# Patient Record
Sex: MALE | Race: BLACK OR AFRICAN AMERICAN
[De-identification: names, ages, dates, MRNs, and addresses within clinical notes are randomized per-mention and may not be internally consistent; named-entity substitution may affect disease eponyms.]

---

## 2019-11-11 LAB
BASOPHILS # BLD AUTO: 0 10*3/UL (ref 0–0.1)
BASOPHILS NFR BLD AUTO: 0.8 % (ref 0–1)
DEPRECATED NEUTROPHILS # BLD AUTO: 1.9 10*3/UL (ref 2.1–6.9)
EOSINOPHIL # BLD AUTO: 0.1 10*3/UL (ref 0–0.4)
EOSINOPHIL NFR BLD AUTO: 1.6 % (ref 0–6)
ERYTHROCYTE [DISTWIDTH] IN CORD BLOOD: 12.4 % (ref 11.7–14.4)
HCT VFR BLD AUTO: 35.9 % (ref 38.2–49.6)
HGB BLD-MCNC: 12.2 G/DL (ref 14–18)
LYMPHOCYTES # BLD: 1.6 10*3/UL (ref 1–3.2)
LYMPHOCYTES NFR BLD AUTO: 40.8 % (ref 18–39.1)
MCH RBC QN AUTO: 33.1 PG (ref 28–32)
MCHC RBC AUTO-ENTMCNC: 34 G/DL (ref 31–35)
MCV RBC AUTO: 97.3 FL (ref 81–99)
MONOCYTES # BLD AUTO: 0.3 10*3/UL (ref 0.2–0.8)
MONOCYTES NFR BLD AUTO: 6.8 % (ref 4.4–11.3)
NEUTS SEG NFR BLD AUTO: 49.7 % (ref 38.7–80)
PLATELET # BLD AUTO: 248 X10E3/UL (ref 140–360)
RBC # BLD AUTO: 3.69 X10E6/UL (ref 4.3–5.7)

## 2019-11-12 ENCOUNTER — HOSPITAL ENCOUNTER (OUTPATIENT)
Dept: HOSPITAL 88 - OR | Age: 64
Discharge: HOME | End: 2019-11-12
Attending: INTERNAL MEDICINE
Payer: MEDICARE

## 2019-11-12 VITALS — SYSTOLIC BLOOD PRESSURE: 139 MMHG | DIASTOLIC BLOOD PRESSURE: 87 MMHG

## 2019-11-12 DIAGNOSIS — Z79.84: ICD-10-CM

## 2019-11-12 DIAGNOSIS — Z01.812: ICD-10-CM

## 2019-11-12 DIAGNOSIS — Z79.02: ICD-10-CM

## 2019-11-12 DIAGNOSIS — I48.91: ICD-10-CM

## 2019-11-12 DIAGNOSIS — Z86.73: ICD-10-CM

## 2019-11-12 DIAGNOSIS — Z12.11: Primary | ICD-10-CM

## 2019-11-12 DIAGNOSIS — E11.9: ICD-10-CM

## 2019-11-12 DIAGNOSIS — I10: ICD-10-CM

## 2019-11-12 DIAGNOSIS — D12.2: ICD-10-CM

## 2019-11-12 PROCEDURE — 85025 COMPLETE CBC W/AUTO DIFF WBC: CPT

## 2019-11-12 PROCEDURE — 45385 COLONOSCOPY W/LESION REMOVAL: CPT

## 2019-11-12 PROCEDURE — 36415 COLL VENOUS BLD VENIPUNCTURE: CPT

## 2019-11-12 PROCEDURE — 88305 TISSUE EXAM BY PATHOLOGIST: CPT

## 2019-11-12 PROCEDURE — 44391 COLONOSCOPY FOR BLEEDING: CPT

## 2019-11-12 PROCEDURE — 82948 REAGENT STRIP/BLOOD GLUCOSE: CPT

## 2019-11-12 NOTE — OPERATIVE REPORT
DATE OF PROCEDURE:  

 

SURGEON:  Sawyer Field MD

 

NAME OF THE PROCEDURE:  Colonoscopy.

 

PREOPERATIVE DIAGNOSIS:  Colon cancer screening.

 

DESCRIPTION OF PROCEDURE:  After informed written consent, premedications with monitored

anesthesia care, a standard video Olympus colonoscope was introduced into the rectum and

all the way into the terminal ileum.  The terminal ileum and cecum appeared to be

normal.  The proximal ascending colon showed a 6 mm polyp, removed by cold snare and one

Endoclip was placed since the patient was on Eliquis.  The descending, transverse,

sigmoid, rectum did not show any obstructive lesions, but the prep was suboptimal. 

 

IMPRESSION:  

1. Suboptimal prep.

2. Colon polyp.

 

RECOMMENDATION:  Resume blood thinners tomorrow and repeat colonoscopy in one year with

a two-day prep to rule out any other lesions. 

 

 

 

 

______________________________

Sawyer Field MD

 

SR/MODL

D:  11/12/2019 08:55:30

T:  11/12/2019 15:34:34

Job #:  [id number]

 

cc:            Lindsey Underwood MD; FAX:[phone] [address] [place] [address].

## 2020-06-12 LAB
BASOPHILS # BLD AUTO: 0 10*3/UL (ref 0–0.1)
BASOPHILS NFR BLD AUTO: 0.7 % (ref 0–1)
DEPRECATED NEUTROPHILS # BLD AUTO: 1.8 10*3/UL (ref 2.1–6.9)
EOSINOPHIL # BLD AUTO: 0.1 10*3/UL (ref 0–0.4)
EOSINOPHIL NFR BLD AUTO: 1.9 % (ref 0–6)
ERYTHROCYTE [DISTWIDTH] IN CORD BLOOD: 12.6 % (ref 11.7–14.4)
HCT VFR BLD AUTO: 35.7 % (ref 38.2–49.6)
HGB BLD-MCNC: 11.6 G/DL (ref 14–18)
LYMPHOCYTES # BLD: 2 10*3/UL (ref 1–3.2)
LYMPHOCYTES NFR BLD AUTO: 47.6 % (ref 18–39.1)
MCH RBC QN AUTO: 32.5 PG (ref 28–32)
MCHC RBC AUTO-ENTMCNC: 32.5 G/DL (ref 31–35)
MCV RBC AUTO: 100 FL (ref 81–99)
MONOCYTES # BLD AUTO: 0.3 10*3/UL (ref 0.2–0.8)
MONOCYTES NFR BLD AUTO: 7.5 % (ref 4.4–11.3)
NEUTS SEG NFR BLD AUTO: 42.3 % (ref 38.7–80)
PLATELET # BLD AUTO: 267 X10E3/UL (ref 140–360)
RBC # BLD AUTO: 3.57 X10E6/UL (ref 4.3–5.7)

## 2020-06-16 ENCOUNTER — HOSPITAL ENCOUNTER (OUTPATIENT)
Dept: HOSPITAL 88 - OR | Age: 65
Discharge: HOME | End: 2020-06-16
Attending: INTERNAL MEDICINE
Payer: MEDICARE

## 2020-06-16 VITALS — DIASTOLIC BLOOD PRESSURE: 74 MMHG | SYSTOLIC BLOOD PRESSURE: 134 MMHG

## 2020-06-16 DIAGNOSIS — D64.9: ICD-10-CM

## 2020-06-16 DIAGNOSIS — Z79.84: ICD-10-CM

## 2020-06-16 DIAGNOSIS — Z11.59: ICD-10-CM

## 2020-06-16 DIAGNOSIS — Z95.5: ICD-10-CM

## 2020-06-16 DIAGNOSIS — I48.91: ICD-10-CM

## 2020-06-16 DIAGNOSIS — I10: ICD-10-CM

## 2020-06-16 DIAGNOSIS — K22.2: ICD-10-CM

## 2020-06-16 DIAGNOSIS — I25.10: ICD-10-CM

## 2020-06-16 DIAGNOSIS — I45.10: ICD-10-CM

## 2020-06-16 DIAGNOSIS — Z86.010: ICD-10-CM

## 2020-06-16 DIAGNOSIS — K31.89: ICD-10-CM

## 2020-06-16 DIAGNOSIS — Z01.812: ICD-10-CM

## 2020-06-16 DIAGNOSIS — Z79.02: ICD-10-CM

## 2020-06-16 DIAGNOSIS — R93.3: ICD-10-CM

## 2020-06-16 DIAGNOSIS — K25.9: ICD-10-CM

## 2020-06-16 DIAGNOSIS — Z91.041: ICD-10-CM

## 2020-06-16 DIAGNOSIS — Z01.810: ICD-10-CM

## 2020-06-16 DIAGNOSIS — K29.50: Primary | ICD-10-CM

## 2020-06-16 DIAGNOSIS — B96.81: ICD-10-CM

## 2020-06-16 DIAGNOSIS — Z79.4: ICD-10-CM

## 2020-06-16 DIAGNOSIS — E11.9: ICD-10-CM

## 2020-06-16 PROCEDURE — 43239 EGD BIOPSY SINGLE/MULTIPLE: CPT

## 2020-06-16 PROCEDURE — 93005 ELECTROCARDIOGRAM TRACING: CPT

## 2020-06-16 PROCEDURE — 36415 COLL VENOUS BLD VENIPUNCTURE: CPT

## 2020-06-16 PROCEDURE — 88312 SPECIAL STAINS GROUP 1: CPT

## 2020-06-16 PROCEDURE — 85025 COMPLETE CBC W/AUTO DIFF WBC: CPT

## 2020-06-16 PROCEDURE — 43233 EGD BALLOON DIL ESOPH30 MM/>: CPT

## 2020-06-16 PROCEDURE — 87635 SARS-COV-2 COVID-19 AMP PRB: CPT

## 2020-06-16 PROCEDURE — 88305 TISSUE EXAM BY PATHOLOGIST: CPT

## 2020-06-16 PROCEDURE — 43249 ESOPH EGD DILATION <30 MM: CPT

## 2020-06-16 PROCEDURE — 82948 REAGENT STRIP/BLOOD GLUCOSE: CPT

## 2020-06-16 NOTE — OPERATIVE REPORT
DATE OF PROCEDURE:  

 

SURGEON:  Sawyer Field MD

 

NAME OF PROCEDURE:  EGD.

 

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

junction. 

 

DESCRIPTION OF PROCEDURE:  After informed written consent, premedications with monitored

anesthesia care. Standard video gastroscope was introduced into the mouth, esophagus,

stomach into the 2nd portion of the duodenum.  There was minimal amount of retained food

in the fundus.  Antrum and body showed gastritis.  The prepyloric antrum showed multiple

superficial gastric ulcers with deformity and biopsies were done from the prepyloric

antrum, antrum body.  The 1st and 2nd portions appeared to be unremarkable except for

some inflammation that was contiguous with the duodenal bulb.  History of a prior ulcer

or surgical procedure in this area cannot be ruled out.  The GE junction appeared a

little bit tight, but since upper GI showed a stricture, this was dilated using 18 to 20

mm CRE balloon. 

 

IMPRESSION:  Gastritis, prepyloric gastric ulcer with deformity in the area of the

pylorus, status post dilation of the GE junction due to stricture seen on upper GI. 

 

RECOMMENDATION:  Avoid aspirin, NSAIDs.  Resume medications.  We will start PPI,

pantoprazole 40 mg once a day, prescription with two refills have been given.  The

patient has been advised to follow up in the office in three weeks. 

 

 

 

 

______________________________

Sawyer Field MD

 

SR/MODL

D:  06/16/2020 09:20:53

T:  06/16/2020 13:22:05

Job #:  [id number]